# Patient Record
Sex: FEMALE | Race: WHITE | Employment: STUDENT | URBAN - METROPOLITAN AREA
[De-identification: names, ages, dates, MRNs, and addresses within clinical notes are randomized per-mention and may not be internally consistent; named-entity substitution may affect disease eponyms.]

---

## 2018-09-10 DIAGNOSIS — R42 VERTIGO: Primary | ICD-10-CM

## 2018-09-11 ENCOUNTER — RADIANT APPOINTMENT (OUTPATIENT)
Dept: MRI IMAGING | Facility: CLINIC | Age: 23
End: 2018-09-11
Attending: OTOLARYNGOLOGY
Payer: COMMERCIAL

## 2018-09-11 ENCOUNTER — OFFICE VISIT (OUTPATIENT)
Dept: OTOLARYNGOLOGY | Facility: CLINIC | Age: 23
End: 2018-09-11
Payer: COMMERCIAL

## 2018-09-11 ENCOUNTER — OFFICE VISIT (OUTPATIENT)
Dept: AUDIOLOGY | Facility: CLINIC | Age: 23
End: 2018-09-11
Payer: COMMERCIAL

## 2018-09-11 VITALS — BODY MASS INDEX: 24.17 KG/M2 | HEIGHT: 67 IN | WEIGHT: 154 LBS

## 2018-09-11 DIAGNOSIS — R42 VERTIGO: ICD-10-CM

## 2018-09-11 DIAGNOSIS — R11.0 NAUSEA: Primary | ICD-10-CM

## 2018-09-11 DIAGNOSIS — R42 DIZZINESS AND GIDDINESS: Primary | ICD-10-CM

## 2018-09-11 DIAGNOSIS — R11.0 NAUSEA: ICD-10-CM

## 2018-09-11 RX ORDER — GADOBUTROL 604.72 MG/ML
7.5 INJECTION INTRAVENOUS ONCE
Status: COMPLETED | OUTPATIENT
Start: 2018-09-11 | End: 2018-09-11

## 2018-09-11 RX ADMIN — GADOBUTROL 6.5 ML: 604.72 INJECTION INTRAVENOUS at 08:47

## 2018-09-11 ASSESSMENT — PAIN SCALES - GENERAL: PAINLEVEL: NO PAIN (0)

## 2018-09-11 NOTE — PROGRESS NOTES
I had the pleasure of meeting Nolan Chowdary for a new patient appointment at the [facility].    HISTORY OF PRESENT ILLNESS: Ms. Chowdary is a 22-year-old woman with a history of migraine with aura who presents for evaluation of episodic vertigo.  The episodes began last week.  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  She was able to fall asleep and felt better after awakening.  She has had 3 additional episodes, each at the end of her shifts.  She has had mild headache with 2 of the episodes but not her full-blown migraine.  She has not experienced any changes in hearing, nor has she experienced ear fullness, tinnitus, numbness, peripheral weakness, aphasia, or any other neurologic symptoms.  Notably, she also is experiencing severe neck pain.  She worked with a new  and recalls straining her neck muscles as she was working to compensate for a shoulder injury during the exercises.  Her neck feels very tight and the pain is in upper thoracic spine radiating upwards into the occipital region.  She does not have symptoms if she turns her head only, meaning that change in vertebral artery status does not seem to bring on the symptoms, nor does she have a positional component to her symptoms.  She has no prior history of similar episodes.    PAST MEDICAL HISTORY:   Past Medical History:   Diagnosis Date     Migraines      Pneumonia      Reduced vision 2008    traumatic eye injury left     Stress fracture of tibia        PAST SURGICAL HISTORY:  Past Surgical History:   Procedure Laterality Date     ARTHRODESIS TIBIOTALOCALCANEAL WITH NAIL Left 3/22/2016    Procedure: ARTHRODESIS TIBIOTALOCALCANEAL WITH NAIL;  Surgeon: Flavio Sierra MD;  Location: [facility]     NO HISTORY OF SURGERY         ALLERGIES:     Allergies   Allergen Reactions     Morphine Nausea and Vomiting       MEDS:  Current Outpatient Prescriptions   Medication     Acetaminophen (TYLENOL PO)     hydrOXYzine (ATARAX) 25 MG tablet     ibuprofen (ADVIL,MOTRIN) 800 MG tablet     MELATONIN PO     norethindrone-ethinyl estradiol (JUNEL FE 1/20) 1-20 MG-MCG per tablet     oxyCODONE (OXYCONTIN) 10 MG 12 hr tablet     oxyCODONE (ROXICODONE) 5 MG immediate release tablet     No current facility-administered medications for this visit.        FAMILY HISTORY/SOCIAL HISTORY:   Family History   Problem Relation Age of Onset     Hypertension Father      Stomach Problem Father      Stomach Problem Mother         Social History     Social History     Marital status: Single     Spouse name: N/A     Number of children: N/A     Years of education: N/A     Occupational History     Not on file.     Social History Main Topics     Smoking status: Never Smoker     Smokeless tobacco: Never Used     Alcohol use Yes      Comment: rare     Drug use: No     Sexual activity: No     Other Topics Concern     Not on file     Social History Narrative   HABITS:   Alcohol use Yes   Comment: rare    History   Smoking Status     Never Smoker   Smokeless Tobacco     Never Used         REVIEW OF SYSTEMS:   Patient Supplied Answers to Review of Systems   ENT ROS 9/11/2018   Neurology Dizzy spells   Musculoskeletal Sore or stiff joints, Neck pain   The remainder of the 10 point review of systems is negative.      PHYSICIAL EXAMINATION:  Constitutional: The patient was well-groomed and in no acute distress.   Skin: Warm and pink.  Psychiatric: The patient's affect was calm, cooperative, and appropriate.   Respiratory: Breathing comfortably without stridor or exertion of accessory muscles.  Eyes: Pupils were equal and reactive. Extraocular movement intact.   Ears: Ear canals are lined with healthy skin bilaterally.  Both tympanic memories are intact and mobile with aerated middle ear  spaces.  Lymphatic: There is no palpable lymphadenopathy or other masses in the neck.   Neurologic: Alert and oriented x 3. Cranial nerves III-XI within normal limits. Voice quality normal.   Otoneurologic: No spontaneous or gaze evoked nystagmus.  I deferred the head impulse test because there is still potential for vertebral artery injury.  Cerebellar Function Tests:  Grossly normal    AUDIOGRAM: Normal hearing bilaterally.  Air-bone gaps noted at 250 Hz and 1 kHz right ear only.  Speech reception thresholds are 10 dB bilaterally.  Word recognition score is 100% on the right and 86% on the left.  Tympanograms bilaterally are type A.  Right ipsilateral contralateral acoustic reflex is present left ipsilateral reflex is present left contralateral reflex absent.    IMPRESSION AND PLAN: Ms. Chowdary has developed episodic spontaneous vertigo accompanied by neurovegetative symptoms.  She also has a history of migraine with aura.  Symptoms began after a exercise training session during and after which she has developed significant neck pain.    Today, immediately following the clinic appointment, we obtained an MRI brain as well as head and neck MRA.  I reviewed the images and also reviewed them with my colleagues in neuroradiology.  The impression is that there is no evidence of stenosis or dissection of major cervical or intracranial arteries and no aneurysm was detected.  Brain MRI showed no abnormal enhancement or evidence of infarct or intracranial hemorrhage.    Given the normal MRI, differential diagnosis includes migraine related vertigo as well as potential for cervical vertigo although this is less likely given that there is not a positional component.  She does not currently have additional symptoms for thoracic outlet syndrome but we may keep this on the differential given potential family history for this.  She is going to meet with our physical therapist and will have cervical musculature assessment.  She  is also considering working with a primary care physician for neck pain and has good relationship with orthopedics here as well.  We discussed different strategies for migraine prevention and have provided a packet of information regarding both dietary changes as well as lifestyle modifications.  Persistent symptoms would prompt me to obtain vestibular function testing potentially and/or also referral to Renetta Boles who is a nurse practitioner in neurology with expertise in migraine management.  We will follow along with the results of the next appointments and will determine our follow-up based on response to therapies and recommended migraine prevention strategies.  She knows to return to the emergency department should she develop any new neurologic symptoms.    thank you very much for the opportunity to participate in the care of your patient.    Niki Feldman MD  Otology & Neurotology  [facility]

## 2018-09-11 NOTE — LETTER
9/11/2018       RE: Nolan Chowdary  [address]  [place] [address]     Dear Colleague,    Thank you for referring your patient, Nolan Chowdary, to the [facility] EAR [facility]SE [facility] THROAT at [facility]. Please see a copy of my visit note below.    I had the pleasure of meeting Nolan Chowdary for a new patient appointment at the [facility].    HISTORY OF PRESENT ILLNESS: Ms. Chowdary is a 22-year-old woman with a history of migraine with aura who presents for evaluation of episodic vertigo.  The episodes began last week.  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  She was able to fall asleep and felt better after awakening.  She has had 3 additional episodes, each at the end of her shifts.  She has had mild headache with 2 of the episodes but not her full-blown migraine.  She has not experienced any changes in hearing, nor has she experienced ear fullness, tinnitus, numbness, peripheral weakness, aphasia, or any other neurologic symptoms.  Notably, she also is experiencing severe neck pain.  She worked with a new  and recalls straining her neck muscles as she was working to compensate for a shoulder injury during the exercises.  Her neck feels very tight and the pain is in upper thoracic spine radiating upwards into the occipital region.  She does not have symptoms if she turns her head only, meaning that change in vertebral artery status does not seem to bring on the symptoms, nor does she have a positional component to her symptoms.  She has no prior history of similar episodes.    PAST MEDICAL HISTORY:   Past Medical History:   Diagnosis Date     Migraines      Pneumonia      Reduced vision 2008    traumatic eye injury left     Stress fracture of tibia         PAST SURGICAL HISTORY:  Past Surgical History:   Procedure Laterality Date     ARTHRODESIS TIBIOTALOCALCANEAL WITH NAIL Left 3/22/2016    Procedure: ARTHRODESIS TIBIOTALOCALCANEAL WITH NAIL;  Surgeon: Flavio Sierra MD;  Location: [facility]     NO HISTORY OF SURGERY         ALLERGIES:    Allergies   Allergen Reactions     Morphine Nausea and Vomiting       MEDS:  Current Outpatient Prescriptions   Medication     Acetaminophen (TYLENOL PO)     hydrOXYzine (ATARAX) 25 MG tablet     ibuprofen (ADVIL,MOTRIN) 800 MG tablet     MELATONIN PO     norethindrone-ethinyl estradiol (JUNEL FE 1/20) 1-20 MG-MCG per tablet     oxyCODONE (OXYCONTIN) 10 MG 12 hr tablet     oxyCODONE (ROXICODONE) 5 MG immediate release tablet     No current facility-administered medications for this visit.        FAMILY HISTORY/SOCIAL HISTORY:   Family History   Problem Relation Age of Onset     Hypertension Father      Stomach Problem Father      Stomach Problem Mother         Social History     Social History     Marital status: Single     Spouse name: N/A     Number of children: N/A     Years of education: N/A     Occupational History     Not on file.     Social History Main Topics     Smoking status: Never Smoker     Smokeless tobacco: Never Used     Alcohol use Yes      Comment: rare     Drug use: No     Sexual activity: No     Other Topics Concern     Not on file     Social History Narrative   HABITS:   Alcohol use Yes   Comment: rare    History   Smoking Status     Never Smoker   Smokeless Tobacco     Never Used         REVIEW OF SYSTEMS:   Patient Supplied Answers to Review of Systems  [facility] ROS 9/11/2018   Neurology Dizzy spells   Musculoskeletal Sore or stiff joints, Neck pain   The remainder of the 10 point review of systems is negative.      PHYSICIAL EXAMINATION:  Constitutional: The patient was well-groomed and in no acute distress.   Skin: Warm and pink.  Psychiatric: The patient's affect was calm, cooperative, and appropriate.    Respiratory: Breathing comfortably without stridor or exertion of accessory muscles.  Eyes: Pupils were equal and reactive. Extraocular movement intact.   Ears: Ear canals are lined with healthy skin bilaterally.  Both tympanic memories are intact and mobile with aerated middle ear spaces.  Lymphatic: There is no palpable lymphadenopathy or other masses in the neck.   Neurologic: Alert and oriented x 3. Cranial nerves III-XI within normal limits. Voice quality normal.   Otoneurologic: No spontaneous or gaze evoked nystagmus.  I deferred the head impulse test because there is still potential for vertebral artery injury.  Cerebellar Function Tests:  Grossly normal    AUDIOGRAM: Normal hearing bilaterally.  Air-bone gaps noted at 250 Hz and 1 kHz right ear only.  Speech reception thresholds are 10 dB bilaterally.  Word recognition score is 100% on the right and 86% on the left.  Tympanograms bilaterally are type A.  Right ipsilateral contralateral acoustic reflex is present left ipsilateral reflex is present left contralateral reflex absent.    IMPRESSION AND PLAN: Ms. Chowdary has developed episodic spontaneous vertigo accompanied by neurovegetative symptoms.  She also has a history of migraine with aura.  Symptoms began after a exercise training session during and after which she has developed significant neck pain.    Today, immediately following the clinic appointment, we obtained an MRI brain as well as head and neck MRA.  I reviewed the images and also reviewed them with my colleagues in neuroradiology.  The impression is that there is no evidence of stenosis or dissection of major cervical or intracranial arteries and no aneurysm was detected.  Brain MRI showed no abnormal enhancement or evidence of infarct or intracranial hemorrhage.    Given the normal MRI, differential diagnosis includes migraine related vertigo as well as potential for cervical vertigo although this is less likely given that there is not a  positional component.  She does not currently have additional symptoms for thoracic outlet syndrome but we may keep this on the differential given potential family history for this.  She is going to meet with our physical therapist and will have cervical musculature assessment.  She is also considering working with a primary care physician for neck pain and has good relationship with orthopedics here as well.  We discussed different strategies for migraine prevention and have provided a packet of information regarding both dietary changes as well as lifestyle modifications.  Persistent symptoms would prompt me to obtain vestibular function testing potentially and/or also referral to Renetta Boles who is a nurse practitioner in neurology with expertise in migraine management.  We will follow along with the results of the next appointments and will determine our follow-up based on response to therapies and recommended migraine prevention strategies.  She knows to return to the emergency department should she develop any new neurologic symptoms.    thank you very much for the opportunity to participate in the care of your patient.    Niki Feldman MD  Otology & Neurotology  [facility]            Again, thank you for allowing me to participate in the care of your patient.      Sincerely,    Niki Feldman MD

## 2018-09-11 NOTE — DISCHARGE INSTRUCTIONS
MRI Contrast Discharge Instructions    The IV contrast you received today will pass out of your body in your  urine. This will happen in the next 24 hours. You will not feel this process.  Your urine will not change color.    Drink at least 4 extra glasses of water or juice today (unless your doctor  has restricted your fluids). This reduces the stress on your kidneys.  You may take your regular medicines.    If you are on dialysis: It is best to have dialysis today.    If you have a reaction: Most reactions happen right away. If you have  any new symptoms after leaving the hospital (such as hives or swelling),  call your hospital at the correct number below. Or call your family doctor.  If you have breathing distress or wheezing, call 911.    Special instructions: ***    I have read and understand the above information.    Signature:______________________________________ Date:___________    Staff:__________________________________________ Date:___________     Time:__________    [place] [facility] Departments:    ___[place]: [phone]  ___[facility]: [phone]  ___[place]: [phone] ___[facility]: [phone]  ___[facility]: [phone]  ___[facility]: [phone]  ___[phone]  ___[facility]: [phone]  ___[phone]

## 2018-09-11 NOTE — PROGRESS NOTES
AUDIOLOGY REPORT    SUMMARY: Audiology visit completed. See audiogram for results.      RECOMMENDATIONS: Follow-up with ENT.    Bia Yousif, [facility] [place]  Licensed Audiologist  [place] License #[id number]

## 2018-09-11 NOTE — LETTER
Date:October 12, 2018      Patient was self referred, no letter generated. Do not send.        [facility] Information

## 2018-09-11 NOTE — MR AVS SNAPSHOT
After Visit Summary   2018    Nolan Chowdary    MRN: [id number]           Patient Information     Date Of Birth          1995        Visit Information        Provider Department      2018 10:00 AM Jocelyn Ba, [facility]        Today's Diagnoses     Dizziness and giddiness    -  1       Follow-ups after your visit        Future tests that were ordered for you today     Open Future Orders        Priority Expected Expires Ordered    MR Brain for Stroke Cmpl w/o & w Contras Routine  2019    MRA Neck (Carotids) wo & w Contrast Routine  2019    MRI Cervical spine w contrast Routine  2019            Who to contact     Please call your clinic at [phone] to:    Ask questions about your health    Make or cancel appointments    Discuss your medicines    Learn about your test results    Speak to your doctor            Additional Information About Your Visit        MyChart Information     [employer] is an electronic gateway that provides easy, online access to your medical records. With [employer], you can request a clinic appointment, read your test results, renew a prescription or communicate with your care team.     To sign up for [employer] visit the website at [email or web address]   You will be asked to enter the access code listed below, as well as some personal information. Please follow the directions to create your username and password.     Your access code is: [email or web address]  Expires: 12/10/2018  6:30 AM     Your access code will  in 90 days. If you need help or a new code, please contact your [facility] or call [phone] for assistance.        Care EveryWhere ID     This is your Care EveryWhere ID. This could be used by other organizations to access your [place] medical records  [id number]         Blood Pressure from Last 3 Encounters:   16 117/64    Weight from Last 3  Encounters:   09/11/18 69.9 kg (154 lb)   03/22/16 60.5 kg (133 lb 6.4 oz)              We Performed the Following     AUDIOGRAM/TYMPANOGRAM - INTERFACE     [facility] Audiometry Thrshld Eval & Speech Recog (15798)     Tymps / Reflex   ([id number])        Primary Care Provider    None Specified       No primary provider on file.        Equal Access to Services     [facility]: Hadii aad ku hadasho Soomaali, waaxda luqadaha, qaybta kaalmada adeegyada, waxay idiin hayaan adeeg khtommy lamalvin . So [facility] [phone].    ATENCIÓN: Si habla espteresita, tiene a alexander disposición servicios gratuitos de asistencia lingüística. Llame al [phone].    We comply with applicable federal civil rights laws and [place] laws. We do not discriminate on the basis of race, color, national origin, age, disability, sex, sexual orientation, or gender identity.            Thank you!     Thank you for choosing [facility]  for your care. Our goal is always to provide you with excellent care. Hearing back from our patients is one way we can continue to improve our services. Please take a few minutes to complete the written survey that you may receive in the mail after your visit with us. Thank you!             Your Updated Medication List - Protect others around you: Learn how to safely use, store and throw away your medicines at www.disposemymeds.org.          This list is accurate as of 9/11/18  4:24 PM.  Always use your most recent med list.                   Brand Name Dispense Instructions for use Diagnosis    hydrOXYzine 25 MG tablet    ATARAX    60 tablet    Take 1-2 tablets (25-50 mg) by mouth every 6 hours as needed for itching    Stress fracture of left tibia, initial encounter       ibuprofen 800 MG tablet    ADVIL/MOTRIN    90 tablet    Take 1 tablet (800 mg) by mouth every 8 hours as needed for pain (mild)    Stress fracture of left tibia, initial encounter       MELATONIN PO           norethindrone-ethinyl estradiol 1-20 MG-MCG  per tablet    JUNEL FE 1/20     Take 1 tablet by mouth daily        * oxyCODONE IR 5 MG tablet    ROXICODONE    80 tablet    Take 1-2 tablets (5-10 mg) by mouth every 3 hours    Stress fracture of left tibia, initial encounter       * oxyCODONE 10 MG 12 hr tablet    OXYCONTIN    30 tablet    Take 1 tablet (10 mg) by mouth every 12 hours    Stress fracture of left tibia, initial encounter       TYLENOL PO      Take 500 mg by mouth        * Notice:  This list has 2 medication(s) that are the same as other medications prescribed for you. Read the directions carefully, and ask your doctor or other care provider to review them with you.

## 2018-09-11 NOTE — NURSING NOTE
Chief Complaint   Patient presents with     Consult     vertigo - first noticed friday - intermittent episodes - 4 since Friday.      Nathan Beauchamp, EMT

## 2018-09-14 ENCOUNTER — THERAPY VISIT (OUTPATIENT)
Dept: PHYSICAL THERAPY | Facility: CLINIC | Age: 23
End: 2018-09-14
Payer: COMMERCIAL

## 2018-09-14 DIAGNOSIS — R42 VERTIGO: Primary | ICD-10-CM

## 2018-09-14 DIAGNOSIS — M54.2 NECK DISCOMFORT: ICD-10-CM

## 2018-09-14 NOTE — PROGRESS NOTES
09/14/18 1400   General Information   Start of Care Date 09/14/18   Referring Physician Dr Feldman   Orders Evaluate and Treat as Indicated   Order Date 09/11/18   Medical Diagnosis vertigo   Surgical/Medical history reviewed Yes   Pertinent history of current vestibular problem (include personal factors and/or comorbidities that impact the POC)  Migraines;Motion sickness  (always been very motion sick person, seldom has migraine)   Pertinent history of current problem (include personal factors and/or comorbidities that impact the POC) 3.5 weeks ago was with  exercising. That night, middle of night woke up with neck spasms, couldnt' do anything for two days. I have had neck spasms since then. They are Improving. A week ago  standing and got really bad vertigo. Was nauseated and layed down immediately. Then two days later woke up and same episode but not as severe. That night her neck popped and immediate spinning and vomited. I had one episode at end of day standing (12 hours) this week. Never had neck spasms in past. Using heat regularly. 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 No headache with any of this. Balance is fine unless during an episode. The episodes last 45 minutes to a couple hours, until I go to sleep.    Prior level of function comment normally regularly exercises has not since vertigo started   Diagnostic Tests MRI;Other  (9/11)   MRI Results unremarkable   Other diagnostic tests MRA of brain also negative  (9/11)   Current Community Support Family/friend caregiver   Patient role/Employment history Employed  (works as scribe full time and )   General Information Comments very pleasant, smiling throughout appt   Fall Risk Screen   Fall screen completed by PT   Have you fallen 2 or more times in the past year? No   Have you fallen and had an injury in the past year? No   Is patient a fall risk? No   Functional Scales   Functional Scales and Outcomes DHI 34/100    Pain   Patient currently in pain No   Observation   Observation she has hyper mobile elbow joints, she has history of shoulder instabilty so joint laxity could be an issue.    Integumentary   Integumentary Comments palpation of cervical and upper thoracic spine has some tenderness around C7-T2 on both paraspinals.    Range of Motion (ROM)   ROM Comment CROM: active rotation to the left is less than the right seated and supine. Sidebend is symmetrical seated and passively supine. Extension/flexion seated WNLs and no symptoms. Upper thoracic spine discomfort  on right side with rotation to the left while seated. Cervical protraction and retraction is fine. Supine passively flexed cervical spine and then did rotation with discomfort on right side with left rotation or left sidebend. Nothing tihe movement to the right. ROM is all WNLS.    Infrared Goggle Exam or Frenzel Lense Exam   Vestibular Suppressant in Last 24 Hours? No   Exam completed with Infrared Goggles   Spontaneous Nystagmus Negative   Gaze Evoked Nystagmus Negative   Head Shake Horizontal Nystagmus Negative   Clinical Impression   Criteria for Skilled Therapeutic Interventions Met yes, treatment indicated   PT Diagnosis neck pain/discomfort and vertigo   Influenced by the following impairments neck  pain, neck spasms, and episodes of vertigo   Functional limitations due to impairments affects ADLs/IADLS, household/community mobility, WORK, recreational activities during an episode   Clinical Presentation Stable/Uncomplicated   Clinical Presentation Rationale medical history, goggles exam, gait, neck AROM/PROM and clinical judgement   Clinical Decision Making (Complexity) Low complexity   Therapy Frequency other (see comments)   Predicted Duration of Therapy Intervention (days/wks) recommend orthopedc PT for continued neck treatment   Risk & Benefits of therapy have been explained Yes   Patient, Family & other staff in agreement with plan of care Yes    Clinical Impression Comments neck pain/discomfort is worse on right side at about C7-T2. I gave her some stretches to do. continue with ice/heat. we will get her set up to see orthopedic PT    Goal 1   Goal Identifier HEP   Goal Description She willb e able to do basic cervical/thoracic stretches for HEP to manage symptoms of neck spasm.    Target Date 09/14/18   Date Met 09/14/18   Total Evaluation Time   Total Evaluation Time (Minutes) 25   Marce Jimenez DPT, MPT, NCS

## 2018-09-14 NOTE — MR AVS SNAPSHOT
After Visit Summary   2018    Nolan Chowdary    MRN: [id number]           Patient Information     Date Of Birth          1995        Visit Information        Provider Department      2018 2:45 PM Senia Jimenez PT [facility]        Today's Diagnoses     Vertigo    -  1    Neck discomfort           Follow-ups after your visit        Who to contact     Please call your clinic at [phone] to:    Ask questions about your health    Make or cancel appointments    Discuss your medicines    Learn about your test results    Speak to your doctor            Additional Information About Your Visit        MyChart Information     u[employer] TV is an electronic gateway that provides easy, online access to your medical records. With u[employer] TV, you can request a clinic appointment, read your test results, renew a prescription or communicate with your care team.     To sign up for u[employer] TV visit the website at [email or web address]   You will be asked to enter the access code listed below, as well as some personal information. Please follow the directions to create your username and password.     Your access code is: [email or web address]  Expires: 12/10/2018  6:30 AM     Your access code will  in 90 days. If you need help or a new code, please contact your [facility] or call [phone] for assistance.        Care EveryWhere ID     This is your Care EveryWhere ID. This could be used by other organizations to access your [facility] medical records  [id number]         Blood Pressure from Last 3 Encounters:   16 117/64    Weight from Last 3 Encounters:   18 69.9 kg (154 lb)   16 60.5 kg (133 lb 6.4 oz)              Today, you had the following     No orders found for display       Primary Care Provider    None Specified       No primary provider on file.        Equal Access to Services     NOHEMI PINEDA : kika Apple,  karen anthony ah. So [facility] [phone].    ATENCIÓN: Si luisa mirza, tiene a alexander disposición servicios gratuitos de asistencia lingüística. Lakisha al [phone].    We comply with applicable federal civil rights laws and [place] laws. We do not discriminate on the basis of race, color, national origin, age, disability, sex, sexual orientation, or gender identity.            Thank you!     Thank you for choosing [facility]  for your care. Our goal is always to provide you with excellent care. Hearing back from our patients is one way we can continue to improve our services. Please take a few minutes to complete the written survey that you may receive in the mail after your visit with us. Thank you!             Your Updated Medication List - Protect others around you: Learn how to safely use, store and throw away your medicines at www.disposemymeds.org.          This list is accurate as of 9/14/18  4:47 PM.  Always use your most recent med list.                   Brand Name Dispense Instructions for use Diagnosis    hydrOXYzine 25 MG tablet    ATARAX    60 tablet    Take 1-2 tablets (25-50 mg) by mouth every 6 hours as needed for itching    Stress fracture of left tibia, initial encounter       ibuprofen 800 MG tablet    ADVIL/MOTRIN    90 tablet    Take 1 tablet (800 mg) by mouth every 8 hours as needed for pain (mild)    Stress fracture of left tibia, initial encounter       MELATONIN PO           norethindrone-ethinyl estradiol 1-20 MG-MCG per tablet    JUNEL FE 1/20     Take 1 tablet by mouth daily        * oxyCODONE IR 5 MG tablet    ROXICODONE    80 tablet    Take 1-2 tablets (5-10 mg) by mouth every 3 hours    Stress fracture of left tibia, initial encounter       * oxyCODONE 10 MG 12 hr tablet    OXYCONTIN    30 tablet    Take 1 tablet (10 mg) by mouth every 12 hours    Stress fracture of left tibia, initial encounter       TYLENOL PO      Take 500 mg  by mouth        * Notice:  This list has 2 medication(s) that are the same as other medications prescribed for you. Read the directions carefully, and ask your doctor or other care provider to review them with you.

## 2018-09-17 DIAGNOSIS — M54.2 NECK PAIN: Primary | ICD-10-CM

## 2018-09-17 DIAGNOSIS — M62.838 NECK MUSCLE SPASM: ICD-10-CM

## 2018-09-25 ENCOUNTER — THERAPY VISIT (OUTPATIENT)
Dept: PHYSICAL THERAPY | Facility: CLINIC | Age: 23
End: 2018-09-25
Payer: COMMERCIAL

## 2018-09-25 DIAGNOSIS — M54.2 CERVICALGIA: Primary | ICD-10-CM

## 2018-09-25 PROCEDURE — 97530 THERAPEUTIC ACTIVITIES: CPT | Mod: GP | Performed by: PHYSICAL THERAPIST

## 2018-09-25 PROCEDURE — 97110 THERAPEUTIC EXERCISES: CPT | Mod: GP | Performed by: PHYSICAL THERAPIST

## 2018-09-25 PROCEDURE — 97161 PT EVAL LOW COMPLEX 20 MIN: CPT | Mod: GP | Performed by: PHYSICAL THERAPIST

## 2018-09-25 NOTE — PROGRESS NOTES
[facility] Initial Evaluation  Subjective:  \[facility]\""    Physical Therapy Initial Evaluation  September 25, 2018     Precautions/Restrictions/MD instructions: PT eval and treat.     Subjective:   Date of Onset: 1 month ago, MD order on 9/17  C/C: left sided neck pain at C7 also has muscle spasms from base of neck up to suboccipitals periodically on both sides. Minimal pain down into both medial borders of shoulder blades. Denies numbness and tingling. Also has had some vertigo with the spasm that have calmed down as she has reduced activity. Vertigo would hit at the end of the day when she was more tired and sore in her neck.   Quality of pain is dull and cramping. Pains are described as intermittent in nature. Pain is worse: evening. Pain is rated 2-6/10.   History of symptoms: Pains began suddenly as the result of performing new exercises with a .. Since onset, symptoms are improving. Previous episodes: left shoulder pain with hx of dislocation. Also  the right shoulder, hx of neck pain when she was 10 after landing on her head - no fractures.   Worsened by: work outs - cardio and strength, typing at work, at the end of the day  Alleviated by: Heat and gentle stretching, activity modification.    General health as reported by patient: excellent  Pertinent medical/surgical history: history of tibial stress fracture, left foot metatarsals x 3, B fibulas. Migraines/head aches every 2 months. She denies night pain, painful cough, painful peripheral motor deficit, recent bowel/bladder change, recent vision change, ringing in the ears or pain with swallowing, significant current illness or recent hospital admission. She denies any regional implanted devices other than left tibial cody. She denies history of surgery in the area.  Imaging: none yet. Current occupational status: ER medical doreen, . Patient's goals are: decrease pain, returing to normal work out  routine. Return to MD:  PRN.  Objective:  CERVICAL:    Posture:forward head and shoulder posture    Neurological:    Motor Deficit:  Myotomes L R   C4 (shoulder elevation) 5/5 5/5   C5 (shoulder abduction) 4+/5 4+/5   C6 (elbow flexion) 5/5 5/5   C7 (elbow extension) 4+/5 4+/5   C8 (thumb extension) 5/5 5/5   T1 (finger add/abd) 5/5 5/5    Strength (lb) 5/5 5/5     Sensory Deficit, Reflexes, Dural Signs:   Intact to light touch sensation in all UE dermatomes. Brachioradialis, triceps, biceps patellar and achilles reflex 2+ B. Negative scapulohumeral reflex, babinski and clonus on repeated attempt. On first attempt pt demo'd 1 beat of clonus on her left LE, though this was not reproduced.    AROM: (Major, Moderate, Minimal or Nil loss)  Movement Loss Yong Mod Min Nil Pain   Protrusion    x    Flexion   x  x   Retraction  x   x   Extension  x   x   Left Rotation   x  x   Right Rotation   x  x   Left Side Bending    x    Right Side bending    x      Repeated movement testing:   (During: produces, abolishes, increases, decreases, no effect, centralizing, peripheralizing; After: better, worse, no better, no worse, no effect, centralized, peripheralized)     Symptoms During Symptoms After ROM increased ROM decreased No Effect   PRO No effect No effect   x   Rep PRO No effect No effect   x   RET No effect No effect   x   Rep RET increases Worse   x      Static Tests:   Alar Ligament test: negative  Transverse Ligament Test:negative  Spurlings: negative  Compression:negative  Distraction:positive for sx relief    Palpation: tender to palpation at suboccipitals, upper trap, cervical paraspinals    Assessment/Plan:    The patient is a 22 year old female with chief complaint of neck and muscle pain.    The patient has the following significant findings with corresponding treatment plan.  Diagnosis 1:  cervicalgia    Pain -  hot/cold therapy, mechanical traction, manual therapy, splint/taping/bracing/orthotics, self  management, education, directional preference exercise and home program  Decreased ROM/flexibility - manual therapy and therapeutic exercise  Decreased strength - therapeutic exercise and therapeutic activities  Impaired muscle performance - neuro re-education  Decreased function - therapeutic activities  Impaired posture - neuro re-education    Therapy Evaluation Codes:   1) History comprised of:   Personal factors that impact the plan of care:      Past/current experiences, Time since onset of symptoms and Work status.    Comorbidity factors that impact the plan of care are:      Migraines/headaches and history of fractures.     Medications impacting care: None.  2) Examination of Body Systems comprised of:   Body structures and functions that impact the plan of care:      Ankle, Cervical spine, Shoulder and Thoracic Spine.   Activity limitations that impact the plan of care are:      Driving, Dressing, Lifting, Reading/Computer work, Sitting, Working and Sleeping.   Clinical presentation characteristics are:    Stable/Uncomplicated.  3) Presentation comprised of:   Presentation scored as Low complexity with uncomplicated characteristics..  4) Decision-Making    Low complexity using standardized patient assessment instrument and/or measureable assessment of functional outcome.  Cumulative Therapy Evaluation is: Low complexity.    Previous and current functional limitations:  (See Goal Flow Sheet for this information)    Short term and Long term goals: (See Goal Flow Sheet for this information)     Communication ability:  Patient appears to be able to clearly communicate and understand verbal and written communication and follow directions correctly.  Treatment Explanation - The following has been discussed with the patient: RX ordered/plan of care, anticipated outcomes, and possible risks and side effects.  This patient would benefit from PT intervention to resume normal activities.   Rehab potential is  good.    Frequency:  1 X week, once daily  Duration:  for 8 weeks  Discharge Plan: Achieve all LTGs, be independent in home treatment program, and reach maximal therapeutic benefit.    Please refer to the daily flowsheet for treatment today, total treatment time and time spent performing 1:1 timed codes.                     Objective:  System    Physical Exam    General     ROS

## 2018-09-25 NOTE — MR AVS SNAPSHOT
After Visit Summary   9/25/2018    Nolan Chowdary    MRN: [id number]           Patient Information     Date Of Birth          1995        Visit Information        Provider Department      9/25/2018 12:40 PM Jocelyn Rhodes PT [facility]        Today's Diagnoses     Cervicalgia    -  1       Follow-ups after your visit        Your next 10 appointments already scheduled     Oct 02, 2018 10:10 AM CDT   NIKA Spine with Jocelyn Rhodes PT   [facility] ([facility]  )    [address] [place] [id number]   [phone]            Oct 08, 2018 10:10 AM CDT   NIKA Spine with Jocelyn Rhodes PT   [facility] ([facility]  )    [address] [place] [address]   [phone]            Oct 15, 2018  8:50 AM CDT   NIKA Spine with Jocelyn Rhodes PT   [facility] ([facility]  )    [address] [place] [address]   [phone]            Oct 26, 2018  9:30 AM CDT   NIKA Spine with Jocleyn Rhodes PT   [facility]  [facility]    [address] [place] [address]   [phone]              Who to contact     If you have questions or need follow up information about today's clinic visit or your schedule please contact [facility] directly at [phone].  Normal or non-critical lab and imaging results will be communicated to you by MyChart, letter or phone within 4 business days after the clinic has received the results. If you do not hear from us within 7 days, please contact the clinic through MyChart or phone. If you have a critical or abnormal lab result, we will notify you by phone as soon as possible.  Submit refill requests  "through [employer] or call your pharmacy and they will forward the refill request to us. Please allow 3 business days for your refill to be completed.          Additional Information About Your Visit        [employer] Information     [employer] lets you send messages to your doctor, view your test results, renew your prescriptions, schedule appointments and more. To sign up, go to [email or web address] . Click on \"Log in\" on the left side of the screen, which will take you to the Welcome page. Then click on \"Sign up Now\" on the right side of the page.     You will be asked to enter the access code listed below, as well as some personal information. Please follow the directions to create your username and password.     Your access code is: [email or web address]  Expires: 12/10/2018  6:30 AM     Your access code will  in 90 days. If you need help or a new code, please call your [facility] clinic or [phone].        Care EveryWhere ID     This is your Care EveryWhere ID. This could be used by other organizations to access your [facility] medical records  [id number]         Blood Pressure from Last 3 Encounters:   16 117/64    Weight from Last 3 Encounters:   18 69.9 kg (154 lb)   16 60.5 kg (133 lb 6.4 oz)              We Performed the Following     HC PT EVAL, LOW COMPLEXITY     NIKA INITIAL EVAL REPORT     THERAPEUTIC ACTIVITIES     THERAPEUTIC EXERCISES        Primary Care Provider    None Specified       No primary provider on file.        Equal Access to Services     NOHEMI PINEDA : Hadii luis Brown, wakodida rosangela, qaybta kaalmakaren flores . So [facility] [phone].    ATENCIÓN: Si habla español, tiene a alexander disposición servicios gratuitos de asistencia lingüística. Lljuliet al [phone].    We comply with applicable federal civil rights laws and [place] laws. We do not discriminate on the basis of race, color, national origin, age, disability, sex, " sexual orientation, or gender identity.            Thank you!     Thank you for choosing [facility]  for your care. Our goal is always to provide you with excellent care. Hearing back from our patients is one way we can continue to improve our services. Please take a few minutes to complete the written survey that you may receive in the mail after your visit with us. Thank you!             Your Updated Medication List - Protect others around you: Learn how to safely use, store and throw away your medicines at www.disposemymeds.org.          This list is accurate as of 9/25/18 11:59 PM.  Always use your most recent med list.                   Brand Name Dispense Instructions for use Diagnosis    hydrOXYzine 25 MG tablet    ATARAX    60 tablet    Take 1-2 tablets (25-50 mg) by mouth every 6 hours as needed for itching    Stress fracture of left tibia, initial encounter       ibuprofen 800 MG tablet    ADVIL/MOTRIN    90 tablet    Take 1 tablet (800 mg) by mouth every 8 hours as needed for pain (mild)    Stress fracture of left tibia, initial encounter       MELATONIN PO           norethindrone-ethinyl estradiol 1-20 MG-MCG per tablet    JUNEL FE 1/20     Take 1 tablet by mouth daily        * oxyCODONE IR 5 MG tablet    ROXICODONE    80 tablet    Take 1-2 tablets (5-10 mg) by mouth every 3 hours    Stress fracture of left tibia, initial encounter       * oxyCODONE 10 MG 12 hr tablet    OXYCONTIN    30 tablet    Take 1 tablet (10 mg) by mouth every 12 hours    Stress fracture of left tibia, initial encounter       TYLENOL PO      Take 500 mg by mouth        * Notice:  This list has 2 medication(s) that are the same as other medications prescribed for you. Read the directions carefully, and ask your doctor or other care provider to review them with you.

## 2018-09-26 PROBLEM — M54.2 CERVICALGIA: Status: ACTIVE | Noted: 2018-09-26

## 2018-10-02 ENCOUNTER — THERAPY VISIT (OUTPATIENT)
Dept: PHYSICAL THERAPY | Facility: CLINIC | Age: 23
End: 2018-10-02
Payer: COMMERCIAL

## 2018-10-02 DIAGNOSIS — M54.2 CERVICALGIA: ICD-10-CM

## 2018-10-02 PROCEDURE — 97530 THERAPEUTIC ACTIVITIES: CPT | Mod: GP | Performed by: PHYSICAL THERAPIST

## 2018-10-02 PROCEDURE — 97140 MANUAL THERAPY 1/> REGIONS: CPT | Mod: GP | Performed by: PHYSICAL THERAPIST

## 2018-10-02 PROCEDURE — 97110 THERAPEUTIC EXERCISES: CPT | Mod: GP | Performed by: PHYSICAL THERAPIST

## 2018-10-08 ENCOUNTER — THERAPY VISIT (OUTPATIENT)
Dept: PHYSICAL THERAPY | Facility: CLINIC | Age: 23
End: 2018-10-08
Payer: COMMERCIAL

## 2018-10-08 DIAGNOSIS — M54.2 CERVICALGIA: ICD-10-CM

## 2018-10-08 PROCEDURE — 97110 THERAPEUTIC EXERCISES: CPT | Mod: GP | Performed by: PHYSICAL THERAPIST

## 2018-10-08 PROCEDURE — 97140 MANUAL THERAPY 1/> REGIONS: CPT | Mod: GP | Performed by: PHYSICAL THERAPIST

## 2018-10-08 PROCEDURE — 97112 NEUROMUSCULAR REEDUCATION: CPT | Mod: GP | Performed by: PHYSICAL THERAPIST

## 2018-10-15 ENCOUNTER — THERAPY VISIT (OUTPATIENT)
Dept: PHYSICAL THERAPY | Facility: CLINIC | Age: 23
End: 2018-10-15
Payer: COMMERCIAL

## 2018-10-15 DIAGNOSIS — M54.2 CERVICALGIA: ICD-10-CM

## 2018-10-15 PROCEDURE — 97140 MANUAL THERAPY 1/> REGIONS: CPT | Mod: GP | Performed by: PHYSICAL THERAPIST

## 2018-10-15 PROCEDURE — 97112 NEUROMUSCULAR REEDUCATION: CPT | Mod: GP | Performed by: PHYSICAL THERAPIST

## 2018-10-15 PROCEDURE — 97110 THERAPEUTIC EXERCISES: CPT | Mod: GP | Performed by: PHYSICAL THERAPIST

## 2018-10-26 ENCOUNTER — THERAPY VISIT (OUTPATIENT)
Dept: PHYSICAL THERAPY | Facility: CLINIC | Age: 23
End: 2018-10-26
Payer: COMMERCIAL

## 2018-10-26 DIAGNOSIS — M54.2 CERVICALGIA: ICD-10-CM

## 2018-10-26 PROCEDURE — 97110 THERAPEUTIC EXERCISES: CPT | Mod: GP | Performed by: PHYSICAL THERAPIST

## 2018-10-26 PROCEDURE — 97112 NEUROMUSCULAR REEDUCATION: CPT | Mod: GP | Performed by: PHYSICAL THERAPIST

## 2018-10-27 ENCOUNTER — OFFICE VISIT (OUTPATIENT)
Dept: URGENT CARE | Facility: URGENT CARE | Age: 23
End: 2018-10-27
Payer: COMMERCIAL

## 2018-10-27 VITALS
DIASTOLIC BLOOD PRESSURE: 83 MMHG | SYSTOLIC BLOOD PRESSURE: 140 MMHG | TEMPERATURE: 97.7 F | OXYGEN SATURATION: 97 % | HEART RATE: 79 BPM | HEIGHT: 67 IN | WEIGHT: 145 LBS | BODY MASS INDEX: 22.76 KG/M2

## 2018-10-27 DIAGNOSIS — R30.0 DYSURIA: Primary | ICD-10-CM

## 2018-10-27 LAB
ALBUMIN UR-MCNC: NEGATIVE MG/DL
APPEARANCE UR: CLEAR
BILIRUB UR QL STRIP: NEGATIVE
COLOR UR AUTO: YELLOW
GLUCOSE UR STRIP-MCNC: NEGATIVE MG/DL
HGB UR QL STRIP: NEGATIVE
KETONES UR STRIP-MCNC: NEGATIVE MG/DL
LEUKOCYTE ESTERASE UR QL STRIP: NEGATIVE
NITRATE UR QL: NEGATIVE
PH UR STRIP: 6.5 PH (ref 5–7)
SOURCE: NORMAL
SP GR UR STRIP: 1.02 (ref 1–1.03)
SPECIMEN SOURCE: NORMAL
UROBILINOGEN UR STRIP-ACNC: 0.2 EU/DL (ref 0.2–1)
WET PREP SPEC: NORMAL

## 2018-10-27 PROCEDURE — 87210 SMEAR WET MOUNT SALINE/INK: CPT | Performed by: FAMILY MEDICINE

## 2018-10-27 PROCEDURE — 81003 URINALYSIS AUTO W/O SCOPE: CPT | Performed by: FAMILY MEDICINE

## 2018-10-27 PROCEDURE — 99202 OFFICE O/P NEW SF 15 MIN: CPT | Performed by: FAMILY MEDICINE

## 2018-10-27 NOTE — MR AVS SNAPSHOT
"              After Visit Summary   10/27/2018    Nolan Chowdary    MRN: [id number]           Patient Information     Date Of Birth          1995        Visit Information        Provider Department      10/27/2018 3:30 PM Javi Guerra MD [facility]        Today's Diagnoses     Dysuria    -  1       Follow-ups after your visit        Your next 10 appointments already scheduled     Nov 09, 2018 10:10 AM CST   NIKA Spine with Jocelyn Rhodes PT   [facility] ([facility]  )    [address] [address] [phone]            Nov 26, 2018 10:10 AM CST   NIKA Spine with Jocelyn Rhodes PT   [facility] ([facility]  )    [address] [place] [address] [phone]              Who to contact     If you have questions or need follow up information about today's clinic visit or your schedule please contact [facility] directly at [phone].  Normal or non-critical lab and imaging results will be communicated to you by MyChart, letter or phone within 4 business days after the clinic has received the results. If you do not hear from us within 7 days, please contact the clinic through MyChart or phone. If you have a critical or abnormal lab result, we will notify you by phone as soon as possible.  Submit refill requests through [facility] app or call your pharmacy and they will forward the refill request to us. Please allow 3 business days for your refill to be completed.          Additional Information About Your Visit        Care EveryWhere ID     This is your Care EveryWhere ID. This could be used by other organizations to access your [facility] medical records  [id number]        Your Vitals Were     Pulse Temperature Height Pulse Oximetry BMI (Body Mass Index)       79 97.7  F (36.5  C) (Tympanic) 5' 7\" (1.702 m) 97% " 22.71 kg/m2        Blood Pressure from Last 3 Encounters:   10/27/18 140/83   03/22/16 117/64    Weight from Last 3 Encounters:   10/27/18 145 lb (65.8 kg)   09/11/18 154 lb (69.9 kg)   03/22/16 133 lb 6.4 oz (60.5 kg)              We Performed the Following     *UA reflex to Microscopic and Culture ([place] [facility] (except [place] and [place])     Wet prep        Primary Care Provider Fax #    Physician No Ref-Primary [phone]       No address on file        Equal Access to Services     [facility]: Hadii luis saenz Sotiffanie, waaxda luqadaha, qaybta kaalmada bartolome, karen cazares . So [facility] [phone].    ATENCIÓN: Si habla español, tiene a alexander disposición servicios gratuitos de asistencia lingüística. [facility] [phone].    We comply with applicable federal civil rights laws and [place] laws. We do not discriminate on the basis of race, color, national origin, age, disability, sex, sexual orientation, or gender identity.            Thank you!     Thank you for choosing [facility]  for your care. Our goal is always to provide you with excellent care. Hearing back from our patients is one way we can continue to improve our services. Please take a few minutes to complete the written survey that you may receive in the mail after your visit with us. Thank you!             Your Updated Medication List - Protect others around you: Learn how to safely use, store and throw away your medicines at www.disposemymeds.org.          This list is accurate as of 10/27/18  3:57 PM.  Always use your most recent med list.                   Brand Name Dispense Instructions for use Diagnosis    hydrOXYzine 25 MG tablet    ATARAX    60 tablet    Take 1-2 tablets (25-50 mg) by mouth every 6 hours as needed for itching    Stress fracture of left tibia, initial encounter       MELATONIN PO           norethindrone-ethinyl estradiol 1-20 MG-MCG per tablet     JUNEDENY FE 1/20     Take 1 tablet by mouth daily

## 2018-10-27 NOTE — PROGRESS NOTES
Subjective: Patient has a week of very mild symptoms including urinary frequency and slight burning, not getting up at night to urinate, thought maybe the urine was darker one time.  She might have had a urinary tract infection in the past but it was never treated and went away on its own.  She is not sexually active and feels she has no STD risks.  There is no vaginal discharge    Objective: No CVAT.  Abdomen benign.  Urine negative and wet prep negative    Assessment and plan: Dysuria, uncertain etiology, relatively mild, make sense to watch and do preventative care.

## 2018-11-09 ENCOUNTER — THERAPY VISIT (OUTPATIENT)
Dept: PHYSICAL THERAPY | Facility: CLINIC | Age: 23
End: 2018-11-09
Payer: COMMERCIAL

## 2018-11-09 DIAGNOSIS — M54.2 CERVICALGIA: ICD-10-CM

## 2018-11-09 PROCEDURE — 97112 NEUROMUSCULAR REEDUCATION: CPT | Mod: GP | Performed by: PHYSICAL THERAPIST

## 2018-11-09 PROCEDURE — 97530 THERAPEUTIC ACTIVITIES: CPT | Mod: GP | Performed by: PHYSICAL THERAPIST

## 2018-11-09 PROCEDURE — 97110 THERAPEUTIC EXERCISES: CPT | Mod: GP | Performed by: PHYSICAL THERAPIST

## 2018-11-26 ENCOUNTER — THERAPY VISIT (OUTPATIENT)
Dept: PHYSICAL THERAPY | Facility: CLINIC | Age: 23
End: 2018-11-26
Payer: COMMERCIAL

## 2018-11-26 DIAGNOSIS — M54.2 CERVICALGIA: ICD-10-CM

## 2018-11-26 PROCEDURE — 97110 THERAPEUTIC EXERCISES: CPT | Mod: GP | Performed by: PHYSICAL THERAPIST

## 2018-11-26 PROCEDURE — 97112 NEUROMUSCULAR REEDUCATION: CPT | Mod: GP | Performed by: PHYSICAL THERAPIST

## 2018-11-26 PROCEDURE — 97530 THERAPEUTIC ACTIVITIES: CPT | Mod: GP | Performed by: PHYSICAL THERAPIST

## 2018-11-26 NOTE — MR AVS SNAPSHOT
After Visit Summary   11/26/2018    Nolan Chowdary    MRN: [id number]           Patient Information     Date Of Birth          1995        Visit Information        Provider Department      11/26/2018 10:10 AM Jocelyn Rhodes PT [facility] Physical [facility]        Today's Diagnoses     Cervicalgia           Follow-ups after your visit        Who to contact     If you have questions or need follow up information about today's clinic visit or your schedule please contact [facility] ATHLETIC [facility] PH[facility] directly at [phone].  Normal or non-critical lab and imaging results will be communicated to you by MyChart, letter or phone within 4 business days after the clinic has received the results. If you do not hear from us within 7 days, please contact the clinic through MyChart or phone. If you have a critical or abnormal lab result, we will notify you by phone as soon as possible.  Submit refill requests through [facility] or call your pharmacy and they will forward the refill request to us. Please allow 3 business days for your refill to be completed.          Additional Information About Your Visit        Care EveryWhere ID     This is your Care EveryWhere ID. This could be used by other organizations to access your [place] medical records  [id number]         Blood Pressure from Last 3 Encounters:   10/27/18 140/83   03/22/16 117/64    Weight from Last 3 Encounters:   10/27/18 65.8 kg (145 lb)   09/11/18 69.9 kg (154 lb)   03/22/16 60.5 kg (133 lb 6.4 oz)              We Performed the Following     NIKA PROGRESS NOTES REPORT     NEUROMUSCULAR RE-EDUCATION     THERAPEUTIC ACTIVITIES     THERAPEUTIC EXERCISES        Primary Care Provider Fax #    Physician No Ref-Primary [phone]       No address on file        Equal Access to Services     NOHEMI PINEDA : Britt Brown, kika adames, jadiel thomas  karen mancusotommy floresaaapolonia ah. [facility] [phone].    ATENCIÓN: Si habla hermes, tiene a alexander disposición servicios gratuitos de asistencia lingüística. Lakisha al [phone].    We comply with applicable federal civil rights laws and [place] laws. We do not discriminate on the basis of race, color, national origin, age, disability, sex, sexual orientation, or gender identity.            Thank you!     Thank you for choosing [facility]  for your care. Our goal is always to provide you with excellent care. Hearing back from our patients is one way we can continue to improve our services. Please take a few minutes to complete the written survey that you may receive in the mail after your visit with us. Thank you!             Your Updated Medication List - Protect others around you: Learn how to safely use, store and throw away your medicines at www.disposemymeds.org.          This list is accurate as of 11/26/18 11:59 PM.  Always use your most recent med list.                   Brand Name Dispense Instructions for use Diagnosis    hydrOXYzine 25 MG tablet    ATARAX    60 tablet    Take 1-2 tablets (25-50 mg) by mouth every 6 hours as needed for itching    Stress fracture of left tibia, initial encounter       MELATONIN PO           norethindrone-ethinyl estradiol 1-20 MG-MCG per tablet    JUNEL FE 1/20     Take 1 tablet by mouth daily

## 2018-11-27 NOTE — PROGRESS NOTES
Subjective:  HPI                    Objective:  System    Physical Exam    General     ROS    Assessment/Plan:    DISCHARGE REPORT    Progress reporting period is from 9/25/18 to 11/26/18.       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 She has occasional mild pain at the right side of her neck but she is able to abolish it with stretches. She has improved in strength throughout her upper body. She feels she is ready to discharge from PT services with HEP    Current pain level is: 0/10.     Previous pain level was: 2/10.   Changes in function:  Yes (See Goal flowsheet attached for changes in current functional level)  Adverse reaction to treatment or activity: None    OBJECTIVE  Changes noted in objective findings:  Yes, Objective: Strength: 5/5 in all UE myotomes. Full and pain free cervical ROM today. Spurlings and compression test: negative.  Demonstrates snatches, power clean, and squats with good mechanics and minor cues to reduce upper trap activation. NDI reduced to 2% disability today.     ASSESSMENT/PLAN  Updated problem list and treatment plan: Diagnosis 1:  Neck pain and muscle spasm  STG/LTGs have been met or progress has been made towards goals:  Yes (See Goal flow sheet completed today.)  Assessment of Progress: The patient's condition is improving.  The patient has met all of their long term goals.  Self Management Plans:  Patient is independent in a home treatment program.  I have re-evaluated this patient and find that the nature, scope, duration and intensity of the therapy is appropriate for the medical condition of the patient.   Nolan continues to require the following intervention to meet STG and LTG's:  PT intervention is no longer required to meet STG/LTG.    Recommendations:  This patient is ready to be discharged from therapy and continue their home treatment program.    Please refer  to the daily flowsheet for treatment today, total treatment time and time spent performing 1:1 timed codes.

## 2019-03-28 ENCOUNTER — TELEPHONE (OUTPATIENT)
Dept: OTHER | Facility: CLINIC | Age: 24
End: 2019-03-28

## 2019-03-28 NOTE — TELEPHONE ENCOUNTER
"Pt referred to [facility] by Kenroy Valerio MD for lower leg pain with activity- cleared by ortho and negative compartment testing.   \"Due to patient's ongoing symptoms within her left lower extremity without resolutions, I recommend she see's vascular to rule out any compromise that would be existing from the vascular standpoint.\"     Patient had MRI of the Lumbar Spine on 3/14/19 at [facility]. [facility] pushing images today.    Pt needs to be scheduled for consult with vascular medicine.  [facility] scheduling [facility]    Carlene Montanez, FARHANAN, RN        "

## 2019-04-01 NOTE — TELEPHONE ENCOUNTER
Left message on home number for patient to call back to schedule consult appointment with Vascular Medicine.

## 2019-06-07 NOTE — TELEPHONE ENCOUNTER
Faxed referral med recs shredded, will need updated med recs if pt schedules new consult at later date.   FARHANA NewberryN, RN